# Patient Record
Sex: MALE | Race: WHITE | Employment: STUDENT | URBAN - NONMETROPOLITAN AREA
[De-identification: names, ages, dates, MRNs, and addresses within clinical notes are randomized per-mention and may not be internally consistent; named-entity substitution may affect disease eponyms.]

---
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
## 2024-12-05 ENCOUNTER — HOSPITAL ENCOUNTER (OUTPATIENT)
Age: 19
Discharge: HOME | End: 2024-12-05
Payer: COMMERCIAL

## 2024-12-05 ENCOUNTER — TELEPHONE (OUTPATIENT)
Dept: FAMILY MEDICINE CLINIC | Age: 19
End: 2024-12-05

## 2024-12-05 DIAGNOSIS — J02.9 SORE THROAT: Primary | ICD-10-CM

## 2024-12-05 LAB — S PYO AG THROAT QL: NEGATIVE

## 2024-12-05 PROCEDURE — 87651 STREP A DNA AMP PROBE: CPT
